# Patient Record
Sex: MALE | Race: BLACK OR AFRICAN AMERICAN | Employment: FULL TIME | URBAN - METROPOLITAN AREA
[De-identification: names, ages, dates, MRNs, and addresses within clinical notes are randomized per-mention and may not be internally consistent; named-entity substitution may affect disease eponyms.]

---

## 2020-11-26 ENCOUNTER — HOSPITAL ENCOUNTER (EMERGENCY)
Age: 40
Discharge: LEFT BEFORE DISCHARGE | End: 2020-11-26
Payer: COMMERCIAL

## 2020-11-26 ENCOUNTER — HOSPITAL ENCOUNTER (EMERGENCY)
Age: 40
Discharge: HOME | End: 2020-11-26
Attending: EMERGENCY MEDICINE
Payer: COMMERCIAL

## 2020-11-26 VITALS
SYSTOLIC BLOOD PRESSURE: 147 MMHG | RESPIRATION RATE: 16 BRPM | BODY MASS INDEX: 25.48 KG/M2 | HEART RATE: 102 BPM | DIASTOLIC BLOOD PRESSURE: 96 MMHG | HEIGHT: 70 IN | WEIGHT: 178 LBS | OXYGEN SATURATION: 95 % | TEMPERATURE: 98.7 F

## 2020-11-26 VITALS
DIASTOLIC BLOOD PRESSURE: 88 MMHG | SYSTOLIC BLOOD PRESSURE: 144 MMHG | RESPIRATION RATE: 18 BRPM | TEMPERATURE: 98.4 F | BODY MASS INDEX: 25.48 KG/M2 | WEIGHT: 178 LBS | HEIGHT: 70 IN | HEART RATE: 82 BPM | OXYGEN SATURATION: 95 %

## 2020-11-26 PROCEDURE — 6360000002 HC RX W HCPCS: Performed by: PHYSICIAN ASSISTANT

## 2020-11-26 PROCEDURE — 90471 IMMUNIZATION ADMIN: CPT | Performed by: PHYSICIAN ASSISTANT

## 2020-11-26 PROCEDURE — 6370000000 HC RX 637 (ALT 250 FOR IP): Performed by: PHYSICIAN ASSISTANT

## 2020-11-26 PROCEDURE — 99283 EMERGENCY DEPT VISIT LOW MDM: CPT

## 2020-11-26 PROCEDURE — 6370000000 HC RX 637 (ALT 250 FOR IP): Performed by: STUDENT IN AN ORGANIZED HEALTH CARE EDUCATION/TRAINING PROGRAM

## 2020-11-26 PROCEDURE — 12013 RPR F/E/E/N/L/M 2.6-5.0 CM: CPT

## 2020-11-26 PROCEDURE — 90715 TDAP VACCINE 7 YRS/> IM: CPT | Performed by: PHYSICIAN ASSISTANT

## 2020-11-26 RX ORDER — LIDOCAINE HYDROCHLORIDE 10 MG/ML
INJECTION, SOLUTION INFILTRATION; PERINEURAL
Status: DISCONTINUED
Start: 2020-11-26 | End: 2020-11-26 | Stop reason: HOSPADM

## 2020-11-26 RX ORDER — AMOXICILLIN AND CLAVULANATE POTASSIUM 875; 125 MG/1; MG/1
1 TABLET, FILM COATED ORAL ONCE
Status: COMPLETED | OUTPATIENT
Start: 2020-11-26 | End: 2020-11-26

## 2020-11-26 RX ORDER — OXYCODONE HYDROCHLORIDE 5 MG/1
5 TABLET ORAL ONCE
Status: COMPLETED | OUTPATIENT
Start: 2020-11-26 | End: 2020-11-26

## 2020-11-26 RX ORDER — CEPHALEXIN 500 MG/1
500 CAPSULE ORAL 2 TIMES DAILY
Qty: 14 CAPSULE | Refills: 0 | Status: SHIPPED | OUTPATIENT
Start: 2020-11-26 | End: 2020-12-03

## 2020-11-26 RX ORDER — LIDOCAINE HYDROCHLORIDE AND EPINEPHRINE 10; 10 MG/ML; UG/ML
20 INJECTION, SOLUTION INFILTRATION; PERINEURAL ONCE
Status: DISCONTINUED | OUTPATIENT
Start: 2020-11-26 | End: 2020-11-26 | Stop reason: HOSPADM

## 2020-11-26 RX ORDER — AMOXICILLIN AND CLAVULANATE POTASSIUM 875; 125 MG/1; MG/1
1 TABLET, FILM COATED ORAL 2 TIMES DAILY WITH MEALS
Qty: 20 TABLET | Refills: 0 | Status: SHIPPED | OUTPATIENT
Start: 2020-11-26 | End: 2020-12-06

## 2020-11-26 RX ORDER — LIDOCAINE HYDROCHLORIDE 10 MG/ML
20 INJECTION, SOLUTION INFILTRATION; PERINEURAL ONCE
Status: DISCONTINUED | OUTPATIENT
Start: 2020-11-26 | End: 2020-11-26 | Stop reason: HOSPADM

## 2020-11-26 RX ADMIN — TETANUS TOXOID, REDUCED DIPHTHERIA TOXOID AND ACELLULAR PERTUSSIS VACCINE, ADSORBED 0.5 ML: 5; 2.5; 8; 8; 2.5 SUSPENSION INTRAMUSCULAR at 05:07

## 2020-11-26 RX ADMIN — AMOXICILLIN AND CLAVULANATE POTASSIUM 1 TABLET: 875; 125 TABLET, FILM COATED ORAL at 05:07

## 2020-11-26 RX ADMIN — OXYCODONE 5 MG: 5 TABLET ORAL at 09:21

## 2020-11-26 ASSESSMENT — PAIN DESCRIPTION - DESCRIPTORS: DESCRIPTORS: DISCOMFORT

## 2020-11-26 ASSESSMENT — PAIN DESCRIPTION - PAIN TYPE
TYPE: ACUTE PAIN
TYPE: ACUTE PAIN

## 2020-11-26 ASSESSMENT — PAIN SCALES - GENERAL
PAINLEVEL_OUTOF10: 10

## 2020-11-26 ASSESSMENT — PAIN DESCRIPTION - LOCATION
LOCATION: MOUTH
LOCATION: MOUTH

## 2020-11-26 ASSESSMENT — PAIN DESCRIPTION - ORIENTATION: ORIENTATION: RIGHT

## 2020-11-26 NOTE — ED NOTES
Bed: 20  Expected date:   Expected time:   Means of arrival:   Comments:  triage     Abundio Sinha RN  11/26/20 2138
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
## 2020-11-28 NOTE — PROGRESS NOTES
CLINICAL PHARMACY NOTE: MEDS TO [address] [place] [address] Drive Select Patient?: No  Total # of Prescriptions Filled: 1   The following medications were delivered to the patient:  · Cephalexin 500 mg  Total # of Interventions Completed: 3  Time Spent (min): 15    Additional Documentation:

## 2020-11-30 ENCOUNTER — TELEPHONE (OUTPATIENT)
Dept: SURGERY | Age: 40
End: 2020-11-30

## 2020-11-30 ENCOUNTER — OFFICE VISIT (OUTPATIENT)
Dept: SURGERY | Age: 40
End: 2020-11-30
Payer: COMMERCIAL

## 2020-11-30 VITALS — HEIGHT: 70 IN | TEMPERATURE: 98.2 F | WEIGHT: 178 LBS | BODY MASS INDEX: 25.48 KG/M2

## 2020-11-30 PROCEDURE — 99203 OFFICE O/P NEW LOW 30 MIN: CPT | Performed by: PHYSICIAN ASSISTANT

## 2020-11-30 RX ORDER — ACETAMINOPHEN AND CODEINE PHOSPHATE 300; 30 MG/1; MG/1
1 TABLET ORAL EVERY 4 HOURS PRN
Qty: 10 TABLET | Refills: 0 | Status: SHIPPED | OUTPATIENT
Start: 2020-11-30 | End: 2020-12-05
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
## 2020-11-30 NOTE — TELEPHONE ENCOUNTER
Priti Nair pt was seen in ED 11/25. Please review and let me know when we need to see patient in office.

## 2020-12-01 ENCOUNTER — TELEPHONE (OUTPATIENT)
Dept: SURGERY | Age: 40
End: 2020-12-01

## 2021-03-16 ENCOUNTER — HOSPITAL ENCOUNTER (EMERGENCY)
Age: 41
Discharge: HOME | End: 2021-03-16
Attending: EMERGENCY MEDICINE
Payer: COMMERCIAL

## 2021-03-16 ENCOUNTER — APPOINTMENT (OUTPATIENT)
Dept: CT IMAGING | Age: 41
End: 2021-03-16
Payer: COMMERCIAL

## 2021-03-16 VITALS
SYSTOLIC BLOOD PRESSURE: 124 MMHG | RESPIRATION RATE: 16 BRPM | OXYGEN SATURATION: 97 % | TEMPERATURE: 97 F | WEIGHT: 178 LBS | BODY MASS INDEX: 25.48 KG/M2 | HEIGHT: 70 IN | DIASTOLIC BLOOD PRESSURE: 78 MMHG | HEART RATE: 57 BPM

## 2021-03-16 DIAGNOSIS — S39.012A STRAIN OF LUMBAR REGION, INITIAL ENCOUNTER: ICD-10-CM

## 2021-03-16 DIAGNOSIS — M54.17 LUMBOSACRAL RADICULOPATHY: Primary | ICD-10-CM

## 2021-03-16 LAB
ALBUMIN SERPL-MCNC: 4.4 G/DL (ref 3.5–5.2)
ALP BLD-CCNC: 54 U/L (ref 40–129)
ALT SERPL-CCNC: 10 U/L (ref 0–40)
ANION GAP SERPL CALCULATED.3IONS-SCNC: 6 MMOL/L (ref 7–16)
ANISOCYTOSIS: ABNORMAL
AST SERPL-CCNC: 21 U/L (ref 0–39)
BASOPHILS ABSOLUTE: 0.04 E9/L (ref 0–0.2)
BASOPHILS RELATIVE PERCENT: 0.4 % (ref 0–2)
BILIRUB SERPL-MCNC: 0.3 MG/DL (ref 0–1.2)
BUN BLDV-MCNC: 15 MG/DL (ref 6–20)
CALCIUM SERPL-MCNC: 9 MG/DL (ref 8.6–10.2)
CHLORIDE BLD-SCNC: 104 MMOL/L (ref 98–107)
CO2: 30 MMOL/L (ref 22–29)
CREAT SERPL-MCNC: 1.1 MG/DL (ref 0.7–1.2)
EOSINOPHILS ABSOLUTE: 0.19 E9/L (ref 0.05–0.5)
EOSINOPHILS RELATIVE PERCENT: 1.9 % (ref 0–6)
GFR AFRICAN AMERICAN: >60
GFR NON-AFRICAN AMERICAN: >60 ML/MIN/1.73
GLUCOSE BLD-MCNC: 91 MG/DL (ref 74–99)
HCT VFR BLD CALC: 41.6 % (ref 37–54)
HEMOGLOBIN: 13.6 G/DL (ref 12.5–16.5)
IMMATURE GRANULOCYTES #: 0.02 E9/L
IMMATURE GRANULOCYTES %: 0.2 % (ref 0–5)
LACTIC ACID: 1.1 MMOL/L (ref 0.5–2.2)
LIPASE: 18 U/L (ref 13–60)
LYMPHOCYTES ABSOLUTE: 1.85 E9/L (ref 1.5–4)
LYMPHOCYTES RELATIVE PERCENT: 19 % (ref 20–42)
MCH RBC QN AUTO: 32 PG (ref 26–35)
MCHC RBC AUTO-ENTMCNC: 32.7 % (ref 32–34.5)
MCV RBC AUTO: 97.9 FL (ref 80–99.9)
MONOCYTES ABSOLUTE: 0.62 E9/L (ref 0.1–0.95)
MONOCYTES RELATIVE PERCENT: 6.4 % (ref 2–12)
NEUTROPHILS ABSOLUTE: 7.04 E9/L (ref 1.8–7.3)
NEUTROPHILS RELATIVE PERCENT: 72.1 % (ref 43–80)
PDW BLD-RTO: 11.9 FL (ref 11.5–15)
PLATELET # BLD: 150 E9/L (ref 130–450)
PMV BLD AUTO: 12.2 FL (ref 7–12)
POTASSIUM REFLEX MAGNESIUM: 3.8 MMOL/L (ref 3.5–5)
RBC # BLD: 4.25 E12/L (ref 3.8–5.8)
SODIUM BLD-SCNC: 140 MMOL/L (ref 132–146)
TOTAL PROTEIN: 7 G/DL (ref 6.4–8.3)
WBC # BLD: 9.8 E9/L (ref 4.5–11.5)

## 2021-03-16 PROCEDURE — 85025 COMPLETE CBC W/AUTO DIFF WBC: CPT

## 2021-03-16 PROCEDURE — 96374 THER/PROPH/DIAG INJ IV PUSH: CPT

## 2021-03-16 PROCEDURE — 83605 ASSAY OF LACTIC ACID: CPT

## 2021-03-16 PROCEDURE — 83690 ASSAY OF LIPASE: CPT

## 2021-03-16 PROCEDURE — 6370000000 HC RX 637 (ALT 250 FOR IP): Performed by: EMERGENCY MEDICINE

## 2021-03-16 PROCEDURE — 96372 THER/PROPH/DIAG INJ SC/IM: CPT

## 2021-03-16 PROCEDURE — 74177 CT ABD & PELVIS W/CONTRAST: CPT

## 2021-03-16 PROCEDURE — 99284 EMERGENCY DEPT VISIT MOD MDM: CPT

## 2021-03-16 PROCEDURE — 99283 EMERGENCY DEPT VISIT LOW MDM: CPT

## 2021-03-16 PROCEDURE — 6360000004 HC RX CONTRAST MEDICATION: Performed by: RADIOLOGY

## 2021-03-16 PROCEDURE — 6360000002 HC RX W HCPCS: Performed by: STUDENT IN AN ORGANIZED HEALTH CARE EDUCATION/TRAINING PROGRAM

## 2021-03-16 PROCEDURE — 80053 COMPREHEN METABOLIC PANEL: CPT

## 2021-03-16 PROCEDURE — 2580000003 HC RX 258: Performed by: STUDENT IN AN ORGANIZED HEALTH CARE EDUCATION/TRAINING PROGRAM

## 2021-03-16 RX ORDER — HYDROCODONE BITARTRATE AND ACETAMINOPHEN 5; 325 MG/1; MG/1
1 TABLET ORAL ONCE
Status: COMPLETED | OUTPATIENT
Start: 2021-03-16 | End: 2021-03-16

## 2021-03-16 RX ORDER — HYDROCODONE BITARTRATE AND ACETAMINOPHEN 5; 325 MG/1; MG/1
1 TABLET ORAL EVERY 6 HOURS PRN
Qty: 8 TABLET | Refills: 0 | Status: SHIPPED | OUTPATIENT
Start: 2021-03-16 | End: 2021-03-19

## 2021-03-16 RX ORDER — KETOROLAC TROMETHAMINE 30 MG/ML
15 INJECTION, SOLUTION INTRAMUSCULAR; INTRAVENOUS ONCE
Status: COMPLETED | OUTPATIENT
Start: 2021-03-16 | End: 2021-03-16

## 2021-03-16 RX ORDER — ORPHENADRINE CITRATE 30 MG/ML
60 INJECTION INTRAMUSCULAR; INTRAVENOUS ONCE
Status: COMPLETED | OUTPATIENT
Start: 2021-03-16 | End: 2021-03-16

## 2021-03-16 RX ORDER — CYCLOBENZAPRINE HCL 10 MG
10 TABLET ORAL 3 TIMES DAILY PRN
Qty: 15 TABLET | Refills: 0 | Status: SHIPPED | OUTPATIENT
Start: 2021-03-16 | End: 2021-03-31

## 2021-03-16 RX ORDER — NAPROXEN 500 MG/1
500 TABLET ORAL 2 TIMES DAILY WITH MEALS
Qty: 60 TABLET | Refills: 0 | Status: SHIPPED | OUTPATIENT
Start: 2021-03-16 | End: 2021-12-29

## 2021-03-16 RX ORDER — 0.9 % SODIUM CHLORIDE 0.9 %
1000 INTRAVENOUS SOLUTION INTRAVENOUS ONCE
Status: COMPLETED | OUTPATIENT
Start: 2021-03-16 | End: 2021-03-16

## 2021-03-16 RX ADMIN — ORPHENADRINE CITRATE 60 MG: 60 INJECTION INTRAMUSCULAR; INTRAVENOUS at 19:37

## 2021-03-16 RX ADMIN — HYDROCODONE BITARTRATE AND ACETAMINOPHEN 1 TABLET: 5; 325 TABLET ORAL at 19:37

## 2021-03-16 RX ADMIN — SODIUM CHLORIDE 1000 ML: 9 INJECTION, SOLUTION INTRAVENOUS at 16:41

## 2021-03-16 RX ADMIN — KETOROLAC TROMETHAMINE 15 MG: 30 INJECTION, SOLUTION INTRAMUSCULAR at 16:49

## 2021-03-16 RX ADMIN — IOPAMIDOL 75 ML: 755 INJECTION, SOLUTION INTRAVENOUS at 17:46

## 2021-03-16 ASSESSMENT — ENCOUNTER SYMPTOMS
SORE THROAT: 0
COUGH: 0
NAUSEA: 0
ABDOMINAL PAIN: 0
BACK PAIN: 1
RHINORRHEA: 0
SHORTNESS OF BREATH: 0
VOMITING: 0
DIARRHEA: 0

## 2021-03-16 ASSESSMENT — PAIN DESCRIPTION - FREQUENCY: FREQUENCY: CONTINUOUS

## 2021-03-16 ASSESSMENT — PAIN DESCRIPTION - ORIENTATION: ORIENTATION: LEFT

## 2021-03-16 NOTE — ED PROVIDER NOTES
Marianna Trujillo is a 46 y.o. male with no PMHx who presents for evaluation of low back / flank pain, beginning prior to arrival.  The complaint has been persistent, moderate in severity, and worsened by movement. The patient states that one week ago he started to have low back pain on the bilateral lumbar spine. The patient states he was just \"powering through it\" at work. The patient notes that yesterday the pain started to be worse on the left side and he has had a hard time walking today. The pain is sharp in nature and then shoots up the spine. The patient denies any urine or fecal incontinence, saddle paresthesia. The patient denies any trauma or injury to the area. The history is provided by the patient and medical records. Review of Systems   Constitutional: Negative for chills, diaphoresis and fever. HENT: Negative for congestion, rhinorrhea and sore throat. Eyes: Negative for visual disturbance. Respiratory: Negative for cough and shortness of breath. Cardiovascular: Negative for chest pain. Gastrointestinal: Negative for abdominal pain, diarrhea, nausea and vomiting. Genitourinary: Negative for dysuria and urgency. Musculoskeletal: Positive for back pain. Skin: Negative for rash. Neurological: Negative for dizziness, light-headedness, numbness and headaches. Physical Exam  Vitals signs and nursing note reviewed. Constitutional:       General: He is in acute distress (appears uncomfortable). Appearance: Normal appearance. He is well-developed. He is ill-appearing. HENT:      Head: Normocephalic and atraumatic. Right Ear: External ear normal.      Left Ear: External ear normal.   Eyes:      General:         Right eye: No discharge. Left eye: No discharge. Extraocular Movements: Extraocular movements intact. Conjunctiva/sclera: Conjunctivae normal.   Neck:      Musculoskeletal: Normal range of motion and neck supple. Cardiovascular:      Rate and Rhythm: Normal rate and regular rhythm. Heart sounds: Normal heart sounds. No murmur. Pulmonary:      Effort: Pulmonary effort is normal. No respiratory distress. Breath sounds: Normal breath sounds. No stridor. No wheezing. Abdominal:      General: There is no distension. Palpations: Abdomen is soft. There is no mass. Tenderness: There is no abdominal tenderness. There is left CVA tenderness. Hernia: No hernia is present. Genitourinary:     Testes: Normal.         Right: Mass, tenderness or swelling not present. Right testis is descended. Left: Mass, tenderness or swelling not present. Left testis is descended. Musculoskeletal:         General: Tenderness (lumbar spine) present. Skin:     General: Skin is warm and dry. Coloration: Skin is not jaundiced or pale. Neurological:      Mental Status: He is alert and oriented to person, place, and time. Cranial Nerves: No cranial nerve deficit. Coordination: Coordination normal.          Yolette Rausch presents to the ED for evaluation of low back pain. Patient notes bilateral initially now worse one the left. Radiates down left and the back. Patient denies any numbness, tingling, urinary retention, stool or fecal incontinence. Denies any saddle paresthesia. Does not appear to be cauda equina. Workup in the ED revealed labs within normal limits, CT abdomen pelvis negative for acute findings. Patient was given Toradol, Norflex with some improvement of symptoms. Likely musculoskeletal nature, discussed with the patient. Reasons to return discussed. . Patient continues to be non-toxic on re-evaluation. Findings were discussed with the patient and reasons to immediately return to the ED were articulated to them.  They will follow-up with their PCP.    --------------------------------------------- PAST HISTORY ---------------------------------------------  Past Medical History:  has no past medical history on file. Past Surgical History:  has no past surgical history on file. Social History:  reports that he has never smoked. He has never used smokeless tobacco. He reports previous alcohol use. He reports current drug use. Frequency: 3.00 times per week. Drug: Marijuana. Family History: family history includes No Known Problems in his father. The patients home medications have been reviewed. Allergies: Patient has no known allergies.     -------------------------------------------------- RESULTS -------------------------------------------------  Labs:  Results for orders placed or performed during the hospital encounter of 03/16/21   CBC Auto Differential   Result Value Ref Range    WBC 9.8 4.5 - 11.5 E9/L    RBC 4.25 3.80 - 5.80 E12/L    Hemoglobin 13.6 12.5 - 16.5 g/dL    Hematocrit 41.6 37.0 - 54.0 %    MCV 97.9 80.0 - 99.9 fL    MCH 32.0 26.0 - 35.0 pg    MCHC 32.7 32.0 - 34.5 %    RDW 11.9 11.5 - 15.0 fL    Platelets 588 412 - 829 E9/L    MPV 12.2 (H) 7.0 - 12.0 fL    Neutrophils % 72.1 43.0 - 80.0 %    Immature Granulocytes % 0.2 0.0 - 5.0 %    Lymphocytes % 19.0 (L) 20.0 - 42.0 %    Monocytes % 6.4 2.0 - 12.0 %    Eosinophils % 1.9 0.0 - 6.0 %    Basophils % 0.4 0.0 - 2.0 %    Neutrophils Absolute 7.04 1.80 - 7.30 E9/L    Immature Granulocytes # 0.02 E9/L    Lymphocytes Absolute 1.85 1.50 - 4.00 E9/L    Monocytes Absolute 0.62 0.10 - 0.95 E9/L    Eosinophils Absolute 0.19 0.05 - 0.50 E9/L    Basophils Absolute 0.04 0.00 - 0.20 E9/L    Anisocytosis 1+    Lipase   Result Value Ref Range    Lipase 18 13 - 60 U/L   Lactic Acid, Plasma   Result Value Ref Range    Lactic Acid 1.1 0.5 - 2.2 mmol/L   Comprehensive Metabolic Panel w/ Reflex to MG   Result Value Ref Range    Sodium 140 132 - 146 mmol/L    Potassium reflex Magnesium 3.8 3.5 - 5.0 mmol/L    Chloride 104 98 - 107 mmol/L    CO2 30 (H) 22 - 29 mmol/L    Anion Gap 6 (L) 7 - 16 mmol/L    Glucose 91 74 - 99 mg/dL    BUN 15 6 - 20 mg/dL    CREATININE 1.1 0.7 - 1.2 mg/dL    GFR Non-African American >60 >=60 mL/min/1.73    GFR African American >60     Calcium 9.0 8.6 - 10.2 mg/dL    Total Protein 7.0 6.4 - 8.3 g/dL    Albumin 4.4 3.5 - 5.2 g/dL    Total Bilirubin 0.3 0.0 - 1.2 mg/dL    Alkaline Phosphatase 54 40 - 129 U/L    ALT 10 0 - 40 U/L    AST 21 0 - 39 U/L       Radiology:  CT ABDOMEN PELVIS W IV CONTRAST Additional Contrast? None   Final Result   No acute abnormality is seen in the abdomen or the pelvis.             ------------------------- NURSING NOTES AND VITALS REVIEWED ---------------------------  Date / Time Roomed:  3/16/2021  3:37 PM  ED Bed Assignment:  22/22    The nursing notes within the ED encounter and vital signs as below have been reviewed. /78   Pulse 57   Temp 97 °F (36.1 °C)   Resp 16   Ht 5' 10\" (1.778 m)   Wt 178 lb (80.7 kg)   SpO2 97%   BMI 25.54 kg/m²   Oxygen Saturation Interpretation: Normal      ------------------------------------------ PROGRESS NOTES ------------------------------------------  11:41 PM EDT  I have spoken with the patient and discussed todays results, in addition to providing specific details for the plan of care and counseling regarding the diagnosis and prognosis. Their questions are answered at this time and they are agreeable with the plan. I discussed at length with them reasons for immediate return here for re evaluation. They will followup with their primary care physician by calling their office tomorrow. --------------------------------- ADDITIONAL PROVIDER NOTES ---------------------------------  At this time the patient is without objective evidence of an acute process requiring hospitalization or inpatient management. They have remained hemodynamically stable throughout their entire ED visit and are stable for discharge with outpatient follow-up.      The plan has been discussed in detail and they are aware of the specific conditions for emergent return, as well as the importance of follow-up. Discharge Medication List as of 3/16/2021  7:24 PM      START taking these medications    Details   cyclobenzaprine (FLEXERIL) 10 MG tablet Take 1 tablet by mouth 3 times daily as needed for Muscle spasms, Disp-15 tablet, R-0, DAWPrint      naproxen (NAPROSYN) 500 MG tablet Take 1 tablet by mouth 2 times daily (with meals), Disp-60 tablet, R-0Print      HYDROcodone-acetaminophen (NORCO) 5-325 MG per tablet Take 1 tablet by mouth every 6 hours as needed for Pain for up to 3 days. , Disp-8 tablet, R-0Print             Diagnosis:  1. Lumbosacral radiculopathy    2. Strain of lumbar region, initial encounter        Disposition:  Patient's disposition: Discharge to home  Patient's condition is stable.          Jaiden Corley DO  Resident  03/16/21 9761

## 2021-03-17 NOTE — ED NOTES
Pt given d/c instructions. Pt to f.u with pcp in 3 days. Pt given instructions on when to return to ED. Pt verbalized understanding of instructions. Pt left in stable and ambulatory condition. Pt IV d/c without any complications.      Tahira Tariq RN  03/16/21 2010

## 2021-03-24 ENCOUNTER — HOSPITAL ENCOUNTER (EMERGENCY)
Age: 41
Discharge: HOME | End: 2021-03-25
Payer: COMMERCIAL

## 2021-03-24 DIAGNOSIS — M51.37 DEGENERATIVE DISC DISEASE AT L5-S1 LEVEL: ICD-10-CM

## 2021-03-24 DIAGNOSIS — G89.29 ACUTE EXACERBATION OF CHRONIC LOW BACK PAIN: Primary | ICD-10-CM

## 2021-03-24 DIAGNOSIS — M54.50 ACUTE EXACERBATION OF CHRONIC LOW BACK PAIN: Primary | ICD-10-CM

## 2021-03-24 PROCEDURE — 99282 EMERGENCY DEPT VISIT SF MDM: CPT

## 2021-03-24 PROCEDURE — 6360000002 HC RX W HCPCS: Performed by: PHYSICIAN ASSISTANT

## 2021-03-24 PROCEDURE — 96372 THER/PROPH/DIAG INJ SC/IM: CPT

## 2021-03-24 RX ORDER — TRAMADOL HYDROCHLORIDE 50 MG/1
50 TABLET ORAL EVERY 6 HOURS PRN
Qty: 20 TABLET | Refills: 0 | Status: SHIPPED | OUTPATIENT
Start: 2021-03-24 | End: 2021-03-29

## 2021-03-24 RX ORDER — ORPHENADRINE CITRATE 30 MG/ML
60 INJECTION INTRAMUSCULAR; INTRAVENOUS ONCE
Status: COMPLETED | OUTPATIENT
Start: 2021-03-24 | End: 2021-03-24

## 2021-03-24 RX ORDER — KETOROLAC TROMETHAMINE 30 MG/ML
30 INJECTION, SOLUTION INTRAMUSCULAR; INTRAVENOUS ONCE
Status: COMPLETED | OUTPATIENT
Start: 2021-03-24 | End: 2021-03-24

## 2021-03-24 RX ORDER — ORPHENADRINE CITRATE 100 MG/1
100 TABLET, EXTENDED RELEASE ORAL 2 TIMES DAILY
Qty: 10 TABLET | Refills: 0 | Status: SHIPPED | OUTPATIENT
Start: 2021-03-24 | End: 2021-03-29

## 2021-03-24 RX ADMIN — ORPHENADRINE CITRATE 60 MG: 60 INJECTION INTRAMUSCULAR; INTRAVENOUS at 10:01

## 2021-03-24 RX ADMIN — KETOROLAC TROMETHAMINE 30 MG: 30 INJECTION, SOLUTION INTRAMUSCULAR at 10:01

## 2021-03-24 ASSESSMENT — PAIN DESCRIPTION - ORIENTATION: ORIENTATION: LOWER

## 2021-03-24 ASSESSMENT — PAIN DESCRIPTION - FREQUENCY: FREQUENCY: CONTINUOUS

## 2021-03-24 ASSESSMENT — PAIN SCALES - GENERAL: PAINLEVEL_OUTOF10: 9

## 2021-03-24 NOTE — ED PROVIDER NOTES
[address] [place] [address]  Department of Emergency Medicine   ED  Encounter Note  Admit Date/RoomTime: 3/24/2021  8:42 AM  ED Room: 15/15    NAME: Raquel Orourke  : 1980  MRN: [id number]     Chief Complaint:  Back Pain (told to come back if not better, seen here last wk. )    History of Present Illness       Raquel Orourke is a 46 y.o. old male with a prior history of recurrent intermittent self limited episodes of low back pain, presents to the emergency department by private vehicle, evaluation of  non-traumatic acute-on-chronic, sharp left sided lower lumbar spine and sacral spine pain with radiation, to the left buttocks, for 2 week(s) prior to arrival.  There has been no recent injury as it relates to today's visit. Since onset the symptoms have been persistent and is moderate-to-severe. He has associated signs & symptoms of nothing additional.  He was seen here on  for same complaint which was nontraumatic in nature. He underwent CT imaging of his lower spine found to have degenerative narrowing L5-S1. He was prescribed Naprosyn and hydrocodone and Flexeril for pain. He still has Naprosyn left. He is out of his Norco and Flexeril which she says did help his pain. He reported back to work yesterday and was unable to perform his duties. There have been no new developing symptoms since his last visit. He denies any bladder or bowel incontinence , new weakness, tingling or paresthesias, recent back injection, recent spinal surgery, recent spinal/chiropractic manipulation, history of IVDA, fever or abdominal pain. The pain is aggraveated by any movement. He is not currently enrolled in an pain management program or managed by PCP or back specialist.        ROS   Pertinent positives and negatives are stated within HPI, all other systems reviewed and are negative. Past Medical History:  has no past medical history on file.     Surgical History:  has no past surgical history on file. Social History:  reports that he has never smoked. He has never used smokeless tobacco. He reports previous alcohol use. He reports current drug use. Frequency: 3.00 times per week. Drug: Marijuana. Family History: family history includes No Known Problems in his father. Allergies: Patient has no known allergies. Physical Exam   Oxygen Saturation Interpretation: Normal.        ED Triage Vitals   BP Temp Temp src Pulse Resp SpO2 Height Weight   -- -- -- -- -- -- -- --         Constitutional:  Alert, development consistent with age. HEENT:  NC/NT. Airway patent. Neck:  Normal ROM. Supple. Respiratory:  Clear to auscultation and breath sounds equal.  CV:  Regular rate and rhythm, normal heart sounds, without pathological murmurs, ectopy, gallops, or rubs. GI:  Abdomen Soft, nontender, good bowel sounds. No firm or pulsatile mass. Back: lower lumbar spine and sacral spine left sided. Tenderness: Moderate. Swelling: no.              Range of Motion: diminished range of motion. CVA Tenderness: No.            Straight leg raising:  Bilateral negative. Skin:  no erythema, rash or swelling noted. Distal Function:              Motor deficit: none. Sensory deficit: none. Pulse deficit: none. Calf Tenderness:  No Bilateral.               Edema:  none Both lower extremity(s). Gait:  wide based. Integument:  Normal turgor. Warm, dry, without visible rash. Lymphatics: No lymphangitis or adenopathy noted. Neurological:  Oriented. Motor functions intact. Lab / Imaging Results   (All laboratory and radiology results have been personally reviewed by myself)  Labs:  No results found for this visit on 03/24/21. Imaging: All Radiology results interpreted by Radiologist unless otherwise noted.   No orders to display       ED Course / Medical Decision Making     Medications

## 2021-03-24 NOTE — ED NOTES
Bed: 15  Expected date:   Expected time:   Means of arrival:   Comments:  BHI/low acuity     Antonia Thorpe RN  03/24/21 9243

## 2021-12-29 ENCOUNTER — HOSPITAL ENCOUNTER (EMERGENCY)
Age: 41
Discharge: LEFT BEFORE DISCHARGE | End: 2021-12-29

## 2021-12-29 VITALS
OXYGEN SATURATION: 98 % | WEIGHT: 184 LBS | DIASTOLIC BLOOD PRESSURE: 75 MMHG | TEMPERATURE: 97.8 F | HEART RATE: 85 BPM | BODY MASS INDEX: 26.4 KG/M2 | SYSTOLIC BLOOD PRESSURE: 141 MMHG | RESPIRATION RATE: 16 BRPM

## 2021-12-29 ASSESSMENT — PAIN SCALES - GENERAL: PAINLEVEL_OUTOF10: 10

## 2021-12-29 ASSESSMENT — PAIN DESCRIPTION - LOCATION: LOCATION: LEG

## 2021-12-29 ASSESSMENT — PAIN DESCRIPTION - DESCRIPTORS: DESCRIPTORS: SHARP

## 2021-12-29 ASSESSMENT — PAIN DESCRIPTION - ORIENTATION: ORIENTATION: LEFT;RIGHT

## 2021-12-29 ASSESSMENT — PAIN DESCRIPTION - PAIN TYPE: TYPE: ACUTE PAIN

## 2022-04-25 ENCOUNTER — HOSPITAL ENCOUNTER (OUTPATIENT)
Age: 42
Setting detail: OBSERVATION
Discharge: LEFT BEFORE DISCHARGE | End: 2022-04-26
Attending: EMERGENCY MEDICINE

## 2022-04-25 ENCOUNTER — APPOINTMENT (OUTPATIENT)
Dept: GENERAL RADIOLOGY | Age: 42
End: 2022-04-25

## 2022-04-25 ENCOUNTER — APPOINTMENT (OUTPATIENT)
Dept: CT IMAGING | Age: 42
End: 2022-04-25

## 2022-04-25 VITALS
SYSTOLIC BLOOD PRESSURE: 119 MMHG | DIASTOLIC BLOOD PRESSURE: 76 MMHG | RESPIRATION RATE: 21 BRPM | HEART RATE: 70 BPM | OXYGEN SATURATION: 100 %

## 2022-04-25 DIAGNOSIS — S09.90XA CLOSED HEAD INJURY, INITIAL ENCOUNTER: ICD-10-CM
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
## 2022-04-25 PROBLEM — T14.90XA TRAUMA: Status: ACTIVE | Noted: 2022-04-25

## 2022-04-25 LAB
ABO/RH: NORMAL
ACETAMINOPHEN LEVEL: <5 MCG/ML (ref 10–30)
ALBUMIN SERPL-MCNC: 4.4 G/DL (ref 3.5–5.2)
ALP BLD-CCNC: 55 U/L (ref 40–129)
ALT SERPL-CCNC: 13 U/L (ref 0–40)
ANION GAP SERPL CALCULATED.3IONS-SCNC: 6 MMOL/L (ref 7–16)
ANTIBODY SCREEN: NORMAL
APTT: 32.5 SEC (ref 24.5–35.1)
AST SERPL-CCNC: 22 U/L (ref 0–39)
BILIRUB SERPL-MCNC: 0.3 MG/DL (ref 0–1.2)
BUN BLDV-MCNC: 15 MG/DL (ref 6–20)
CALCIUM SERPL-MCNC: 9.1 MG/DL (ref 8.6–10.2)
CHLORIDE BLD-SCNC: 105 MMOL/L (ref 98–107)
CO2: 30 MMOL/L (ref 22–29)
CREAT SERPL-MCNC: 1.3 MG/DL (ref 0.7–1.2)
ETHANOL: <10 MG/DL (ref 0–0.08)
GFR AFRICAN AMERICAN: >60
GFR NON-AFRICAN AMERICAN: >60 ML/MIN/1.73
GLUCOSE BLD-MCNC: 92 MG/DL (ref 74–99)
HCT VFR BLD CALC: 39.6 % (ref 37–54)
HEMOGLOBIN: 13.1 G/DL (ref 12.5–16.5)
INR BLD: 1.2
LACTIC ACID: 0.8 MMOL/L (ref 0.5–2.2)
MCH RBC QN AUTO: 31.8 PG (ref 26–35)
MCHC RBC AUTO-ENTMCNC: 33.1 % (ref 32–34.5)
MCV RBC AUTO: 96.1 FL (ref 80–99.9)
PDW BLD-RTO: 11.7 FL (ref 11.5–15)
PLATELET # BLD: 160 E9/L (ref 130–450)
PMV BLD AUTO: 11.6 FL (ref 7–12)
POTASSIUM SERPL-SCNC: 3.8 MMOL/L (ref 3.5–5)
PROTHROMBIN TIME: 12.5 SEC (ref 9.3–12.4)
RBC # BLD: 4.12 E12/L (ref 3.8–5.8)
SALICYLATE, SERUM: <0.3 MG/DL (ref 0–30)
SODIUM BLD-SCNC: 141 MMOL/L (ref 132–146)
TOTAL PROTEIN: 6.7 G/DL (ref 6.4–8.3)
TRICYCLIC ANTIDEPRESSANTS SCREEN SERUM: NEGATIVE NG/ML
WBC # BLD: 8.2 E9/L (ref 4.5–11.5)

## 2022-04-25 PROCEDURE — 71045 X-RAY EXAM CHEST 1 VIEW: CPT

## 2022-04-25 PROCEDURE — 82077 ASSAY SPEC XCP UR&BREATH IA: CPT

## 2022-04-25 PROCEDURE — 71260 CT THORAX DX C+: CPT

## 2022-04-25 PROCEDURE — 80053 COMPREHEN METABOLIC PANEL: CPT

## 2022-04-25 PROCEDURE — 80307 DRUG TEST PRSMV CHEM ANLYZR: CPT

## 2022-04-25 PROCEDURE — 80179 DRUG ASSAY SALICYLATE: CPT

## 2022-04-25 PROCEDURE — 70450 CT HEAD/BRAIN W/O DYE: CPT

## 2022-04-25 PROCEDURE — 83605 ASSAY OF LACTIC ACID: CPT

## 2022-04-25 PROCEDURE — 85730 THROMBOPLASTIN TIME PARTIAL: CPT

## 2022-04-25 PROCEDURE — 99285 EMERGENCY DEPT VISIT HI MDM: CPT

## 2022-04-25 PROCEDURE — 74177 CT ABD & PELVIS W/CONTRAST: CPT

## 2022-04-25 PROCEDURE — 85610 PROTHROMBIN TIME: CPT

## 2022-04-25 PROCEDURE — 86901 BLOOD TYPING SEROLOGIC RH(D): CPT

## 2022-04-25 PROCEDURE — 36415 COLL VENOUS BLD VENIPUNCTURE: CPT

## 2022-04-25 PROCEDURE — 6810039000 HC L1 TRAUMA ALERT

## 2022-04-25 PROCEDURE — 85027 COMPLETE CBC AUTOMATED: CPT

## 2022-04-25 PROCEDURE — 80143 DRUG ASSAY ACETAMINOPHEN: CPT

## 2022-04-25 PROCEDURE — 72170 X-RAY EXAM OF PELVIS: CPT

## 2022-04-25 PROCEDURE — 6360000004 HC RX CONTRAST MEDICATION: Performed by: RADIOLOGY

## 2022-04-25 PROCEDURE — 72125 CT NECK SPINE W/O DYE: CPT

## 2022-04-25 PROCEDURE — 86900 BLOOD TYPING SEROLOGIC ABO: CPT

## 2022-04-25 PROCEDURE — 86850 RBC ANTIBODY SCREEN: CPT

## 2022-04-25 RX ORDER — SODIUM CHLORIDE 0.9 % (FLUSH) 0.9 %
5-40 SYRINGE (ML) INJECTION EVERY 12 HOURS SCHEDULED
Status: DISCONTINUED | OUTPATIENT
Start: 2022-04-26 | End: 2022-04-26 | Stop reason: HOSPADM

## 2022-04-25 RX ORDER — ENOXAPARIN SODIUM 100 MG/ML
30 INJECTION SUBCUTANEOUS 2 TIMES DAILY
Status: DISCONTINUED | OUTPATIENT
Start: 2022-04-26 | End: 2022-04-26 | Stop reason: HOSPADM

## 2022-04-25 RX ORDER — SODIUM CHLORIDE 0.9 % (FLUSH) 0.9 %
5-40 SYRINGE (ML) INJECTION PRN
Status: DISCONTINUED | OUTPATIENT
Start: 2022-04-25 | End: 2022-04-26 | Stop reason: HOSPADM

## 2022-04-25 RX ORDER — POLYETHYLENE GLYCOL 3350 17 G/17G
17 POWDER, FOR SOLUTION ORAL DAILY
Status: DISCONTINUED | OUTPATIENT
Start: 2022-04-26 | End: 2022-04-26 | Stop reason: HOSPADM

## 2022-04-25 RX ORDER — FENTANYL CITRATE 50 UG/ML
50 INJECTION, SOLUTION INTRAMUSCULAR; INTRAVENOUS ONCE
Status: DISCONTINUED | OUTPATIENT
Start: 2022-04-25 | End: 2022-04-26 | Stop reason: HOSPADM

## 2022-04-25 RX ORDER — SODIUM CHLORIDE 9 MG/ML
25 INJECTION, SOLUTION INTRAVENOUS PRN
Status: DISCONTINUED | OUTPATIENT
Start: 2022-04-25 | End: 2022-04-26 | Stop reason: HOSPADM

## 2022-04-25 RX ORDER — ONDANSETRON 2 MG/ML
4 INJECTION INTRAMUSCULAR; INTRAVENOUS EVERY 6 HOURS PRN
Status: DISCONTINUED | OUTPATIENT
Start: 2022-04-25 | End: 2022-04-26 | Stop reason: HOSPADM

## 2022-04-25 RX ORDER — ACETAMINOPHEN 325 MG/1
650 TABLET ORAL EVERY 4 HOURS PRN
Status: DISCONTINUED | OUTPATIENT
Start: 2022-04-25 | End: 2022-04-26 | Stop reason: HOSPADM

## 2022-04-25 RX ORDER — ONDANSETRON 4 MG/1
4 TABLET, ORALLY DISINTEGRATING ORAL EVERY 8 HOURS PRN
Status: DISCONTINUED | OUTPATIENT
Start: 2022-04-25 | End: 2022-04-26 | Stop reason: HOSPADM

## 2022-04-25 RX ADMIN — IOPAMIDOL 90 ML: 755 INJECTION, SOLUTION INTRAVENOUS at 22:06

## 2022-04-26 LAB
AMPHETAMINE SCREEN, URINE: NOT DETECTED
BARBITURATE SCREEN URINE: NOT DETECTED
BENZODIAZEPINE SCREEN, URINE: NOT DETECTED
CANNABINOID SCREEN URINE: POSITIVE
COCAINE METABOLITE SCREEN URINE: NOT DETECTED
FENTANYL SCREEN, URINE: NOT DETECTED
Lab: ABNORMAL
METHADONE SCREEN, URINE: NOT DETECTED
OPIATE SCREEN URINE: NOT DETECTED
OXYCODONE URINE: NOT DETECTED
PHENCYCLIDINE SCREEN URINE: NOT DETECTED

## 2022-04-26 PROCEDURE — 6370000000 HC RX 637 (ALT 250 FOR IP): Performed by: STUDENT IN AN ORGANIZED HEALTH CARE EDUCATION/TRAINING PROGRAM

## 2022-04-26 PROCEDURE — 80307 DRUG TEST PRSMV CHEM ANLYZR: CPT

## 2022-04-26 PROCEDURE — G0378 HOSPITAL OBSERVATION PER HR: HCPCS

## 2022-04-26 RX ADMIN — ACETAMINOPHEN 650 MG: 325 TABLET ORAL at 00:22

## 2022-04-26 ASSESSMENT — PAIN SCALES - GENERAL: PAINLEVEL_OUTOF10: 8
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
## 2023-02-24 ENCOUNTER — APPOINTMENT (OUTPATIENT)
Dept: CT IMAGING | Age: 43
End: 2023-02-24
Payer: COMMERCIAL

## 2023-02-24 ENCOUNTER — HOSPITAL ENCOUNTER (EMERGENCY)
Age: 43
Discharge: HOME | End: 2023-02-24
Payer: COMMERCIAL

## 2023-02-24 VITALS
HEART RATE: 57 BPM | SYSTOLIC BLOOD PRESSURE: 109 MMHG | TEMPERATURE: 97.8 F | OXYGEN SATURATION: 100 % | DIASTOLIC BLOOD PRESSURE: 60 MMHG | HEIGHT: 70 IN | RESPIRATION RATE: 14 BRPM | WEIGHT: 180 LBS | BODY MASS INDEX: 25.77 KG/M2

## 2023-02-24 DIAGNOSIS — G89.29 CHRONIC BILATERAL LOW BACK PAIN WITHOUT SCIATICA: Primary | ICD-10-CM

## 2023-02-24 DIAGNOSIS — M48.061 FORAMINAL STENOSIS OF LUMBAR REGION: ICD-10-CM

## 2023-02-24 DIAGNOSIS — M54.50 CHRONIC BILATERAL LOW BACK PAIN WITHOUT SCIATICA: Primary | ICD-10-CM

## 2023-02-24 PROCEDURE — 99284 EMERGENCY DEPT VISIT MOD MDM: CPT

## 2023-02-24 PROCEDURE — 72131 CT LUMBAR SPINE W/O DYE: CPT

## 2023-02-24 PROCEDURE — 96372 THER/PROPH/DIAG INJ SC/IM: CPT

## 2023-02-24 PROCEDURE — 6360000002 HC RX W HCPCS: Performed by: PHYSICIAN ASSISTANT

## 2023-02-24 RX ORDER — DEXAMETHASONE SODIUM PHOSPHATE 10 MG/ML
10 INJECTION INTRAMUSCULAR; INTRAVENOUS ONCE
Status: COMPLETED | OUTPATIENT
Start: 2023-02-24 | End: 2023-02-24

## 2023-02-24 RX ORDER — KETOROLAC TROMETHAMINE 30 MG/ML
30 INJECTION, SOLUTION INTRAMUSCULAR; INTRAVENOUS ONCE
Status: COMPLETED | OUTPATIENT
Start: 2023-02-24 | End: 2023-02-24

## 2023-02-24 RX ORDER — DEXAMETHASONE 6 MG/1
6 TABLET ORAL 2 TIMES DAILY WITH MEALS
Qty: 14 TABLET | Refills: 0 | Status: SHIPPED | OUTPATIENT
Start: 2023-02-24 | End: 2023-03-03

## 2023-02-24 RX ORDER — IBUPROFEN 800 MG/1
800 TABLET ORAL 2 TIMES DAILY PRN
Qty: 30 TABLET | Refills: 0 | Status: SHIPPED | OUTPATIENT
Start: 2023-02-24 | End: 2023-03-11

## 2023-02-24 RX ORDER — METHOCARBAMOL 500 MG/1
500 TABLET, FILM COATED ORAL 2 TIMES DAILY
Qty: 20 TABLET | Refills: 0 | Status: SHIPPED | OUTPATIENT
Start: 2023-02-24 | End: 2023-03-01 | Stop reason: SINTOL

## 2023-02-24 RX ORDER — ORPHENADRINE CITRATE 30 MG/ML
60 INJECTION INTRAMUSCULAR; INTRAVENOUS ONCE
Status: COMPLETED | OUTPATIENT
Start: 2023-02-24 | End: 2023-02-24

## 2023-02-24 RX ORDER — LIDOCAINE 4 G/G
1 PATCH TOPICAL DAILY
Qty: 30 PATCH | Refills: 0 | Status: SHIPPED | OUTPATIENT
Start: 2023-02-24 | End: 2023-03-26

## 2023-02-24 RX ADMIN — KETOROLAC TROMETHAMINE 30 MG: 30 INJECTION, SOLUTION INTRAMUSCULAR; INTRAVENOUS at 10:08

## 2023-02-24 RX ADMIN — ORPHENADRINE CITRATE 60 MG: 30 INJECTION INTRAMUSCULAR; INTRAVENOUS at 10:10

## 2023-02-24 RX ADMIN — DEXAMETHASONE SODIUM PHOSPHATE 10 MG: 10 INJECTION INTRAMUSCULAR; INTRAVENOUS at 10:07

## 2023-02-24 ASSESSMENT — PAIN SCALES - GENERAL: PAINLEVEL_OUTOF10: 10

## 2023-02-24 ASSESSMENT — PAIN DESCRIPTION - LOCATION: LOCATION: BACK

## 2023-02-24 ASSESSMENT — PAIN DESCRIPTION - DESCRIPTORS: DESCRIPTORS: SQUEEZING

## 2023-02-24 ASSESSMENT — PAIN DESCRIPTION - ORIENTATION: ORIENTATION: MID;LOWER

## 2023-02-24 NOTE — ED PROVIDER NOTES
Independent TIM Visit. [facility]  Department of Emergency Medicine   ED  Encounter Note  Admit Date/RoomTime: 2023  9:46 AM  ED Room: WAITING RESULTS/WAITING *    NAME: Mark Augustin  : 1980  MRN: [id number]     Chief Complaint:  Back Pain (Ongoing issue)    History of Present Illness        Mark Augustin is a 41 y.o. old male with a prior history of chronic back pain and prior herniated disc(s) of lumbar spine, presents to the emergency department by private vehicle, for non-traumatic chronic and constant, aching and sharp midline lower lumbar spine pain without radiation, for a few day(s) prior to arrival.  There has been no recent injury as it relates to today's visit. Since onset the symptoms have been persistent and is moderate in severity. He has associated signs & symptoms of nothing additional.   He denies any bladder or bowel incontinence , new weakness, tingling or paresthesias, recent back injection, recent spinal surgery, recent spinal/chiropractic manipulation, history of IVDA, fever, abdominal pain, bladder incontinence, bowel incontinence, bladder retention, bladder urgency, bowel urgency, saddle paresthesias , or sacral numbness. The pain is aggraveated by any movement and relieved by nothing in particular. He has never been enrolled in a pain management program.  Patient states he had an MRI last year and was supposed to see a neurosurgeon but his back got better so he went back to work. 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 Patient denies any loss of bowel or bladder function, general numbness or tingling. Patient states that it is spasming and causing a lot of discomfort. .  ROS   Pertinent positives and negatives are stated within HPI, all other systems reviewed and are negative. Past Medical History:  has no past medical history on file.     Surgical History:  has no past surgical history on file. Social History:  reports that he has never smoked. He has never used smokeless tobacco. He reports that he does not currently use alcohol. He reports current drug use. Frequency: 3.00 times per week. Drug: Marijuana [place]). Family History: family history includes No Known Problems in his father. Allergies: Patient has no known allergies. Physical Exam   Oxygen Saturation Interpretation: Normal.        ED Triage Vitals [02/24/23 0945]   BP Temp Temp Source Heart Rate Resp SpO2 Height Weight   109/60 97.8 °F (36.6 °C) Temporal 57 14 100 % 5' 10\" (1.778 m) 180 lb (81.6 kg)         Constitutional:  Alert, development consistent with age. HEENT:  NC/NT. Airway patent. Neck:  Normal ROM. Supple. Respiratory:  Clear to auscultation and breath sounds equal.  CV:  Regular rate and rhythm, normal heart sounds, without pathological murmurs, ectopy, gallops, or rubs. GI:  Abdomen Soft, nontender, good bowel sounds. No firm or pulsatile mass. Back: lower lumbar spine bilateral.             Tenderness: Mild. Swelling: no.              Range of Motion: full range with pain. CVA Tenderness: No CVA tenderness. Straight leg raising:  Bilateral negative. Skin:  tenderness noted over the lower lumbar spine L3, L4, L5 paraspinally. No step-off deformities midline. No evidence of abscess. No evidence of warmth, cellulitis or induration. Distal Function:              Motor deficit: none. Sensory deficit: none. Pulse deficit: none. Calf Tenderness:  No Bilateral.               Edema:  none Both lower extremity(s). Deep Tendon Reflexes (DTR's) to bilateral knee's and ankle's are normo-reflexive   Gait:  normal.  Integument:  Normal turgor. Warm, dry, without visible rash. Lymphatics: No lymphangitis or adenopathy noted. Neurological:  Oriented. Motor functions intact.     Ian [place] / Blease Stephanie Results   (All laboratory and radiology results have been personally reviewed by myself)  Labs:  No results found for this visit on 02/24/23. Imaging: All Radiology results interpreted by Radiologist unless otherwise noted. CT LUMBAR SPINE WO CONTRAST   Final Result   Degenerative changes at the lumbosacral junction with grade 1 retrolisthesis   of L5 over S1 and severe narrowing of bilateral neural foramina seen at this   level. ED Course / Medical Decision Making     Medications   ketorolac (TORADOL) injection 30 mg (30 mg IntraMUSCular Given 2/24/23 1008)   orphenadrine (NORFLEX) injection 60 mg (60 mg IntraMUSCular Given 2/24/23 1010)   dexamethasone (DECADRON) Oral 10 mg (10 mg Oral Given 2/24/23 1007)        Re-examination:  2/24/23       Time: 1130 patient was seen going to CAT scan and checked on. Patient states he is feeling some relief with medications given. Toradol IM 30 mg given, Decadron 10 mg orally and Norflex 60 mg IM given    Consult(s):   None    Procedure(s):   None    Medical Decision Making/Counseling:   Patient is a 57-year-old presenting with chronic back pain. Patient states he has had this issue for a while was found to have herniated disc. Patient had an MRI done at another facility which showed a herniated disc. Patient states he was with see his neurosurgeon last year but did not follow-up because the pain improved. Patient states he went back to work. 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 Patient states that he is able to stand but has a lot of pain with ambulating. Patient has no neurologic or sensory deficits. Patient has no loss of bowel or bladder function. Patient has no perianal or sacral numbness or tingling. No red flag signs. Patient had examination and pain noted paraspinally in lumbar region on L3, L4, L5. No evidence of abscess. Skin reviewed. No evidence of herpetic zoster. Patient had CT scan which revealed no acute fracture or dislocation which was reviewed by myself. Patient was medicated with Toradol 30 mg IM, Robaxin 60 mg IM and Decadron orally 10 mg. Patient is requesting no narcotics. Patient will be discharged home with Robaxin 750 to be taken twice a day for the next 15 days. Patient was educated at the risk, benefits side effects of this medication that it does cause drowsiness and he cannot drive. Patient will be given lidocaine patches to place on the paraspinal region never directly midline 12 hours on 12 hours off, ibuprofen 800 mg twice a day to be alternating with Tylenol 500 mg once a day. Patient also will be given some steroids Decadron 4 mg taper. Patient was recommended to follow back up with neurosurgery to discuss physical therapy, injections or surgery or what other options are necessary for his pain. Work excuse given. Patient is fully ambulatory no loss of bowel or bladder function and was highly educated the signs symptoms when to return back to the emergency department. Patient stable for outpatient follow-up. At this time the patient is without objective evidence of an acute process requiring inpatient management. History provided is without report of trauma, or neurological symptom. Exam shows evidence of no radicular symptoms without myelopathy or neurovascular compromise. Patient has no significant risk for spontaneous infectious process and is afebrile & non-toxic. Given symptomatic therapy in ER and prescriptions for home along with appropriate instructions regarding taking medications with food and using caution for drowsiness and sedation. No alcohol or driving while taking medication. Any red flag symptoms were to return immediately to the ER for evaluation but otherwise PCP follow up for reevaluation.       The emergency provider has spoken with the patient and discussed todays emergency visit, in addition to providing specific details for the plan of care and counseling regarding the diagnosis and prognosis. He was counseled on the role of the emergency department regarding prescribing medications for chronic conditions including Narcotic and other controlled substances. Based on the presenting complaint and nature of illness, the requested medications will not be provided today in prescription form and he is instructed to contact their provider for supplemental medications as soon as possible. Questions are answered at this time and they are agreeable with the plan. Assessment      1. Chronic bilateral low back pain without sciatica    2. Foraminal stenosis of lumbar region      Plan   Discharged home. Patient condition is stable    New Medications     New Prescriptions    DEXAMETHASONE (DECADRON) 6 MG TABLET    Take 1 tablet by mouth 2 times daily (with meals) for 7 days    IBUPROFEN (ADVIL;MOTRIN) 800 MG TABLET    Take 1 tablet by mouth 2 times daily as needed for Pain    LIDOCAINE 4 % EXTERNAL PATCH    Place 1 patch onto the skin daily    METHOCARBAMOL (ROBAXIN) 500 MG TABLET    Take 1 tablet by mouth in the morning and at bedtime for 5 days     Electronically signed by Candice Fuentes PA-C   DD: 2/24/23  **This report was transcribed using voice recognition software. Every effort was made to ensure accuracy; however, inadvertent computerized transcription errors may be present.   END OF ED PROVIDER NOTE      Candice Fuentes PA-C  02/24/23 7909

## 2023-02-24 NOTE — Clinical Note
Noble Cruz was seen and treated in our emergency department on 2/24/2023. He may return to work on 02/28/2023. If you have any questions or concerns, please don't hesitate to call.       René Sauceda PA-C

## 2023-02-24 NOTE — DISCHARGE INSTRUCTIONS
Ibuprofen twice daily with food. Tylenol for mild-to-moderate pain and robaxin for spasm, however use caution as may cause drowsiness or sedation. Do not drive or drink alcohol while taking. Any increased pain, worsening symptoms or concerns including numbness, tingling, sensation loss to the legs, genitals, rectum, or loss of bowel or bladder control should return to the ER for evaluation. Otherwise follow-up with your PCP in 5-7 days for recheck.

## 2023-03-01 ENCOUNTER — HOSPITAL ENCOUNTER (EMERGENCY)
Age: 43
Discharge: HOME | End: 2023-03-01
Attending: STUDENT IN AN ORGANIZED HEALTH CARE EDUCATION/TRAINING PROGRAM
Payer: COMMERCIAL

## 2023-03-01 VITALS
HEART RATE: 54 BPM | RESPIRATION RATE: 15 BRPM | DIASTOLIC BLOOD PRESSURE: 96 MMHG | WEIGHT: 180 LBS | HEIGHT: 74 IN | BODY MASS INDEX: 23.1 KG/M2 | OXYGEN SATURATION: 99 % | SYSTOLIC BLOOD PRESSURE: 136 MMHG | TEMPERATURE: 98.7 F

## 2023-03-01 DIAGNOSIS — S39.012A STRAIN OF LUMBAR REGION, INITIAL ENCOUNTER: Primary | ICD-10-CM

## 2023-03-01 PROCEDURE — 6370000000 HC RX 637 (ALT 250 FOR IP): Performed by: STUDENT IN AN ORGANIZED HEALTH CARE EDUCATION/TRAINING PROGRAM

## 2023-03-01 PROCEDURE — 6360000002 HC RX W HCPCS: Performed by: STUDENT IN AN ORGANIZED HEALTH CARE EDUCATION/TRAINING PROGRAM

## 2023-03-01 PROCEDURE — 99284 EMERGENCY DEPT VISIT MOD MDM: CPT

## 2023-03-01 PROCEDURE — 96372 THER/PROPH/DIAG INJ SC/IM: CPT

## 2023-03-01 RX ORDER — CYCLOBENZAPRINE HCL 5 MG
5 TABLET ORAL 2 TIMES DAILY PRN
Qty: 10 TABLET | Refills: 0 | Status: SHIPPED | OUTPATIENT
Start: 2023-03-01 | End: 2023-03-11

## 2023-03-01 RX ORDER — HYDROXYZINE HYDROCHLORIDE 10 MG/1
50 TABLET, FILM COATED ORAL ONCE
Status: COMPLETED | OUTPATIENT
Start: 2023-03-01 | End: 2023-03-01

## 2023-03-01 RX ORDER — ORPHENADRINE CITRATE 30 MG/ML
60 INJECTION INTRAMUSCULAR; INTRAVENOUS ONCE
Status: COMPLETED | OUTPATIENT
Start: 2023-03-01 | End: 2023-03-01

## 2023-03-01 RX ORDER — KETOROLAC TROMETHAMINE 30 MG/ML
15 INJECTION, SOLUTION INTRAMUSCULAR; INTRAVENOUS ONCE
Status: COMPLETED | OUTPATIENT
Start: 2023-03-01 | End: 2023-03-01

## 2023-03-01 RX ADMIN — ALUMINUM HYDROXIDE, MAGNESIUM HYDROXIDE, AND SIMETHICONE: 200; 200; 20 SUSPENSION ORAL at 08:16

## 2023-03-01 RX ADMIN — KETOROLAC TROMETHAMINE 15 MG: 30 INJECTION, SOLUTION INTRAMUSCULAR; INTRAVENOUS at 08:18

## 2023-03-01 RX ADMIN — ORPHENADRINE CITRATE 60 MG: 30 INJECTION INTRAMUSCULAR; INTRAVENOUS at 08:18

## 2023-03-01 RX ADMIN — HYDROXYZINE HYDROCHLORIDE 50 MG: 10 TABLET ORAL at 08:16

## 2023-03-01 ASSESSMENT — PAIN DESCRIPTION - LOCATION
LOCATION: BACK
LOCATION: BACK

## 2023-03-01 ASSESSMENT — LIFESTYLE VARIABLES
HOW OFTEN DO YOU HAVE A DRINK CONTAINING ALCOHOL: NEVER
HOW MANY STANDARD DRINKS CONTAINING ALCOHOL DO YOU HAVE ON A TYPICAL DAY: PATIENT DOES NOT DRINK

## 2023-03-01 ASSESSMENT — PAIN SCALES - GENERAL
PAINLEVEL_OUTOF10: 3
PAINLEVEL_OUTOF10: 7

## 2023-03-01 ASSESSMENT — PAIN DESCRIPTION - DESCRIPTORS: DESCRIPTORS: ACHING

## 2023-03-01 NOTE — Clinical Note
Ramon House was seen and treated in our emergency department on 3/1/2023. He may return to work on 03/06/2023. If you have any questions or concerns, please don't hesitate to call.       Regino Crow, DO

## 2023-03-01 NOTE — ED PROVIDER NOTES
[address]        Pt Name: Pedro Carolina  MRN: [id number]  [place] 1980  Date of evaluation: 3/1/2023  Provider: Ha George DO  PCP: No primary care provider on file. Note Started: 7:23 AM EST 3/1/23    CHIEF COMPLAINT       Chief Complaint   Patient presents with    Back Pain     Previously seen for this Friday 2/24     Medication Reaction     Reaction to meds we gave on 2/24- itchiness, gerd        HISTORY OF PRESENT ILLNESS: 1 or more Elements   History From: patient    Limitations to history : None    Pedro Carolina is a 41 y.o. male who presents to the emergency department complaining of back pain and concern for possible medication reaction. Patient states that his symptoms were sudden onset several days ago, persistent, moderate severity, nothing makes it better or worse. 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 He had a CT scan performed at that time and was treated with muscle relaxer, steroids, and pain medications and lidocaine patches and discharged home. He states that he feels itchy and feels like he is getting reflux from the medications that were given because he is only able to take everything at night because he is scared that is going to make him sleepy for work. Patient describes back pain as aching in his lower back and nonradiating. Nothing makes it better or worse. He denies any fall, trauma, fever, urinary incontinence, bowel incontinence, saddle anesthesia, numbness or tingling in his legs, recent hospitalization, recent was, or other acute symptoms or concerns. He does not have any history of back surgeries. Nursing Notes were all reviewed and agreed with or any disagreements were addressed in the HPI.     REVIEW OF SYSTEMS :      Review of Systems    Positives and Pertinent negatives as per HPI. SURGICAL HISTORY   History reviewed. No pertinent surgical history. [address]       Discharge Medication List as of 3/1/2023  8:27 AM        CONTINUE these medications which have NOT CHANGED    Details   dexamethasone (DECADRON) 6 MG tablet Take 1 tablet by mouth 2 times daily (with meals) for 7 days, Disp-14 tablet, R-0Normal      lidocaine 4 % external patch Place 1 patch onto the skin daily, TransDERmal, DAILY Starting Fri 2/24/2023, Until Sun 3/26/2023, For 30 days, Disp-30 patch, R-0, Normal      ibuprofen (ADVIL;MOTRIN) 800 MG tablet Take 1 tablet by mouth 2 times daily as needed for Pain, Disp-30 tablet, R-0Normal             ALLERGIES     Patient has no known allergies. FAMILYHISTORY       Family History   Problem Relation Age of Onset    No Known Problems Father         SOCIAL HISTORY       Social History     Tobacco Use    Smoking status: Never    Smokeless tobacco: Never   Vaping Use    Vaping Use: Never used   Substance Use Topics    Alcohol use: Not Currently    Drug use: Yes     Frequency: 3.0 times per week     Types: Marijuana (Weed)       SCREENINGS                         CIWA Assessment  BP: (!) 136/96  Heart Rate: 54           PHYSICAL EXAM  1 or more Elements     ED Triage Vitals   BP Temp Temp src Pulse Resp SpO2 Height Weight   -- -- -- -- -- -- -- --       Physical Exam      Constitutional/General: Alert and oriented x3  Head: Normocephalic and atraumatic  Eyes: PERRL, EOMI, conjunctiva normal, sclera non icteric  ENT:  Oropharynx clear, handling secretions, no trismus, no asymmetry of the posterior oropharynx or uvular edema  Neck: Supple, full ROM, no stridor, no meningeal signs  Respiratory: Lungs clear to auscultation bilaterally, no wheezes, rales, or rhonchi. Not in respiratory distress  Cardiovascular:  Regular rate. Regular rhythm. No murmurs, no gallops, no rubs. 2+ distal pulses. Equal extremity pulses. Chest: No chest wall tenderness  GI:  Abdomen Soft, Non tender, Non distended. +BS. No rebound, guarding, or rigidity. No pulsatile masses. Musculoskeletal: Moves all extremities x 4. Warm and well perfused, no clubbing, no cyanosis, no edema. Mild tenderness diffusely over the lower back. No step-offs or deformities. No evidence of trauma capillary refill <3 seconds  Integument: skin warm and dry. No rashes. Neurologic: GCS 15, no focal deficits, symmetric strength 5/5 in the upper and lower extremities bilaterally  Psychiatric: Normal Affect      DIAGNOSTIC RESULTS   LABS:    Labs Reviewed - No data to display    As interpreted by me, the above displayed labs are abnormal. All other labs obtained during this visit were within normal range or not returned as of this dictation. RADIOLOGY:   Non-plain film images such as CT, Ultrasound and MRI are read by the radiologist.     Interpretation per the Radiologist below, if available at the time of this note:    No orders to display     [facility]    Result Date: 2/24/2023  EXAMINATION: CT OF [facility] 276 WITHOUT CONTRAST  2/24/2023 TECHNIQUE: CT of the lumbar spine was performed without the administration of intravenous contrast. Multiplanar reformatted images are provided for review. Adjustment of mA and/or kV according to patient size was utilized. Automated exposure control, iterative reconstruction, and/or weight based adjustment of the mA/kV was utilized to reduce the radiation dose to as low as reasonably achievable.  COMPARISON: None HISTORY: ORDERING SYSTEM PROVIDED HISTORY: hx herniated disc, MRI last year never followed up with neurosurgery mopping and reinjured area TECHNOLOGIST PROVIDED HISTORY: Reason for exam:->hx herniated disc, MRI last year never followed up with neurosurgery mopping and reinjured area Decision Support Exception - unselect if not a suspected or confirmed emergency medical condition->Emergency Medical Condition (MA) FINDINGS: There is straightening of the alignment of the columns of the lumbar spine. Grade 1 anterolisthesis of L5 over S1 is seen. The vertebral body heights are maintained. No osseous destructive lesion is seen. Decreased intervertebral disc height visualized at L5-S1 with nitrogen disc phenomena visualized. Severe narrowing of bilateral neural foramina visualized at L5-S1. No paraspinal mass is seen. Degenerative changes at the lumbosacral junction with grade 1 retrolisthesis of L5 over S1 and severe narrowing of bilateral neural foramina seen at this level. No results found. PROCEDURES   Unless otherwise noted below, none     Procedures    CRITICAL CARE TIME (.cct)   0    PAST MEDICAL HISTORY/Chronic Conditions Affecting Care      has no past medical history on file. EMERGENCY DEPARTMENT COURSE    Vitals:    Vitals:    03/01/23 0729 03/01/23 0828   BP: (!) 141/93 (!) 136/96   Pulse: 62 54   Resp: 14 15   Temp: 98.7 °F (37.1 °C)    TempSrc: Oral    SpO2: 98% 99%   Weight: 180 lb (81.6 kg)    Height: 6' 2\" (1.88 m)        Patient was given the following medications:  Medications   orphenadrine (NORFLEX) injection 60 mg (60 mg IntraMUSCular Given 3/1/23 0818)   ketorolac (TORADOL) injection 15 mg (15 mg IntraMUSCular Given 3/1/23 0818)   hydrOXYzine HCl (ATARAX) tablet 50 mg (50 mg Oral Given 3/1/23 0816)   aluminum & magnesium hydroxide-simethicone (MAALOX) 30 mL, lidocaine viscous hcl (XYLOCAINE) 5 mL (GI COCKTAIL) ( Oral Given 3/1/23 0816)           Medical Decision Making/Differential Diagnosis:    CC/HPI Summary, Social Determinants of health, Records Reviewed, DDx, testing done/not done, ED Course, Reassessment, disposition considerations/shared decision making with patient, consults, disposition:        The patient is a 57-year-old male presents emergency department complaining of back pain and concern for medication reaction.   He is hemodynamically stable, nontoxic, in no acute distress. No known social detriments to his health. No red flag signs or symptoms. He denies any saddle anesthesia, numbness or tingling in his legs, fevers, bladder or bowel incontinence or other acute symptoms or concerns. Prior records reviewed and patient was actually seen here on February 24 for his back pain and had a CT scan performed at that time that showed degenerative changes and narrowing of the bilateral neuro foramina at L5-S1. Patient was discharged home with ibuprofen, lidocaine patch, Robaxin, and Decadron. Differential diagnosis includes was not limited to acute back strain versus sciatica versus herniated disc. Patient was complaining of some GERD symptoms but did not have a chest pain or shortness of breath. He was given a GI cocktail. He was given Atarax for his itchiness. Patient was given Toradol IM and Norflex IM as well. He is already on steroids at home. I discussed with him stopping the Robaxin and starting Flexeril to see if that helped or made a difference at all. Patient was discharged home in stable condition with strict return precautions. CONSULTS: (Who and What was discussed)  None        I am the Primary Clinician of Record. FINAL IMPRESSION      1. Strain of lumbar region, initial encounter          DISPOSITION/PLAN     DISPOSITION Decision To Discharge 03/01/2023 08:27:47 AM      PATIENT REFERRED TO:  Follow up with your doctor or call [phone] for a family doctor. Follow up with your doctor or call [phone] for a family doctor.   Schedule an appointment as soon as possible for a visit       DISCHARGE MEDICATIONS:  Discharge Medication List as of 3/1/2023  8:27 AM        START taking these medications    Details   cyclobenzaprine (FLEXERIL) 5 MG tablet Take 1 tablet by mouth 2 times daily as needed for Muscle spasms, Disp-10 tablet, R-0Normal             DISCONTINUED MEDICATIONS:  Discharge Medication List as of 3/1/2023  8:27 AM (Please note that portions of this note were completed with a voice recognition program.  Efforts were made to edit the dictations but occasionally words are mis-transcribed.)    Thi Emery DO (electronically signed)           Thi Emery DO  03/01/23 2018

## 2023-03-01 NOTE — ED NOTES
Pt presents to the ED from home with wife, has back pain which was previously treated here on 2/24. Was given meds including muscle relaxer and pt states it is causing severe itching and gerd s/s.       Robert  03/01/23 0781

## 2023-03-01 NOTE — Clinical Note
Isaias Vergara was seen and treated in our emergency department on 3/1/2023. He may return to work on 03/06/2023. If you have any questions or concerns, please don't hesitate to call.       Madison Hsu, DO

## 2023-07-29 ENCOUNTER — HOSPITAL ENCOUNTER (EMERGENCY)
Age: 43
Discharge: HOME | End: 2023-07-29
Payer: COMMERCIAL

## 2023-07-29 ENCOUNTER — APPOINTMENT (OUTPATIENT)
Dept: CT IMAGING | Age: 43
End: 2023-07-29
Payer: COMMERCIAL

## 2023-07-29 VITALS
DIASTOLIC BLOOD PRESSURE: 81 MMHG | HEART RATE: 76 BPM | RESPIRATION RATE: 14 BRPM | OXYGEN SATURATION: 98 % | TEMPERATURE: 98.1 F | SYSTOLIC BLOOD PRESSURE: 132 MMHG

## 2023-07-29 DIAGNOSIS — S39.012A STRAIN OF LUMBAR REGION, INITIAL ENCOUNTER: Primary | ICD-10-CM

## 2023-07-29 PROCEDURE — 72131 CT LUMBAR SPINE W/O DYE: CPT

## 2023-07-29 PROCEDURE — 99284 EMERGENCY DEPT VISIT MOD MDM: CPT

## 2023-07-29 PROCEDURE — 6370000000 HC RX 637 (ALT 250 FOR IP)

## 2023-07-29 PROCEDURE — 96372 THER/PROPH/DIAG INJ SC/IM: CPT

## 2023-07-29 PROCEDURE — 6360000002 HC RX W HCPCS

## 2023-07-29 RX ORDER — CYCLOBENZAPRINE HCL 10 MG
10 TABLET ORAL 3 TIMES DAILY PRN
Qty: 21 TABLET | Refills: 0 | Status: SHIPPED | OUTPATIENT
Start: 2023-07-29 | End: 2023-08-08

## 2023-07-29 RX ORDER — LIDOCAINE 50 MG/G
1 PATCH TOPICAL DAILY
Qty: 10 PATCH | Refills: 0 | Status: SHIPPED | OUTPATIENT
Start: 2023-07-29 | End: 2023-08-08

## 2023-07-29 RX ORDER — NAPROXEN 500 MG/1
500 TABLET ORAL 2 TIMES DAILY PRN
Qty: 60 TABLET | Refills: 0 | Status: SHIPPED | OUTPATIENT
Start: 2023-07-29

## 2023-07-29 RX ORDER — KETOROLAC TROMETHAMINE 30 MG/ML
30 INJECTION, SOLUTION INTRAMUSCULAR; INTRAVENOUS ONCE
Status: COMPLETED | OUTPATIENT
Start: 2023-07-29 | End: 2023-07-29

## 2023-07-29 RX ORDER — CYCLOBENZAPRINE HCL 10 MG
10 TABLET ORAL ONCE
Status: COMPLETED | OUTPATIENT
Start: 2023-07-29 | End: 2023-07-29

## 2023-07-29 RX ORDER — DEXAMETHASONE SODIUM PHOSPHATE 10 MG/ML
8 INJECTION INTRAMUSCULAR; INTRAVENOUS ONCE
Status: COMPLETED | OUTPATIENT
Start: 2023-07-29 | End: 2023-07-29

## 2023-07-29 RX ADMIN — CYCLOBENZAPRINE 10 MG: 10 TABLET, FILM COATED ORAL at 01:15

## 2023-07-29 RX ADMIN — KETOROLAC TROMETHAMINE 30 MG: 30 INJECTION, SOLUTION INTRAMUSCULAR; INTRAVENOUS at 01:14

## 2023-07-29 RX ADMIN — DEXAMETHASONE SODIUM PHOSPHATE 8 MG: 10 INJECTION INTRAMUSCULAR; INTRAVENOUS at 01:15

## 2023-07-29 ASSESSMENT — PAIN DESCRIPTION - DESCRIPTORS: DESCRIPTORS: THROBBING

## 2023-07-29 ASSESSMENT — PAIN SCALES - GENERAL: PAINLEVEL_OUTOF10: 10

## 2023-07-29 ASSESSMENT — PAIN DESCRIPTION - LOCATION: LOCATION: BACK

## 2023-07-29 ASSESSMENT — PAIN DESCRIPTION - ORIENTATION: ORIENTATION: LOWER

## 2023-07-29 NOTE — ED PROVIDER NOTES
Independent TIM Visit. [facility]   ED  Encounter Note  Admit Date/RoomTime: 2023 12:35 AM  ED Room: 35/35    NAME: Maggie Lowery  : 1980  MRN: [id number]     Chief Complaint:  Back Pain (Chronic back pain, states it is throbbing. Was here earlier today for the same thing, was never seen and left. )    History of Present Illness       Maggie Lowery is a 41 y.o. old male with a prior history of chronic back pain, presents to the emergency department by private vehicle, for traumatic acute-on-chronic, aching midline lower lumbar spine pain without radiation, for a few hour(s) prior to arrival.  There has been a new injury as it relates to today's presenting complaint. Since onset the symptoms have been persistent and is mild-to-moderate in severity. He has associated signs & symptoms of nothing additional.   He denies any bladder or bowel incontinence , new weakness, tingling or paresthesias, recent back injection, recent spinal surgery, recent spinal/chiropractic manipulation, history of IVDA, fever, abdominal pain, or saddle paresthesias . The pain is aggraveated by flexion, extension, and twisting and relieved by nothing in particular. He is not currently enrolled in an pain management program or managed by PCP or back specialist.   Patient states that they were carrying this past year, and he states that this washer slipped and he fell backwards landing on his lower back. States that he does have a history of chronic lower back pain, but did have an injury today. Denies any numbness, weakness or paresthesias. Patient states he came earlier but it was too busy so he left. He denies any numbness, weakness or paresthesias. No other complaints concerns at this time. .  ROS   Pertinent positives and negatives are stated within HPI, all other systems reviewed and are negative.     Past Medical History:  has no past medical limited to vertebral fracture, disc herniation, acute on chronic lower back pain, lumbar strain. Diagnostic studies including labs and imagining which was interpreted by radiologist reveals CT lumbar spine shows unremarkable noncontrast CT of the lumbar spine. . Consults included none. Results were discussed with the patient, he verbalized understanding. Patient was given p.o. Flexeril, IM Toradol, and IM Decadron for their symptoms with good improvement. Patient will be discharged home with the following prescriptions, Flexeril, naproxen, and Lidoderm patches. Discussed appropriate use and potential side effects of starting the prescribed medications. Patient continues to be non-toxic on re-evaluation. Findings were discussed with the patient and reasons to immediately return to the ED were articulated to them. They will follow-up with their PMD and patient verbalized understanding. All questions were addressed. They understand return precautions and discharge instructions. The patient expressed understanding. Vitals were stable and they were in no distress at discharge.        Discharge Instructions:   Patient referred to  [facility]  [phone]  Schedule an appointment as soon as possible for a visit in 3 days  For follow up      MEDICATIONS:   DISCHARGE MEDICATIONS:  Discharge Medication List as of 7/29/2023  2:14 AM        START taking these medications    Details   cyclobenzaprine (FLEXERIL) 10 MG tablet Take 1 tablet by mouth 3 times daily as needed for Muscle spasms, Disp-21 tablet, R-0Normal      naproxen (NAPROSYN) 500 MG tablet Take 1 tablet by mouth 2 times daily as needed for Pain, Disp-60 tablet, R-0Normal      lidocaine (LIDODERM) 5 % Place 1 patch onto the skin daily for 10 days 12 hours on, 12 hours off., Disp-10 patch, R-0Normal             DISCONTINUED MEDICATIONS:  Discharge Medication List as of 7/29/2023  2:14 AM        STOP taking these medications       ibuprofen

## 2025-01-28 ENCOUNTER — HOSPITAL ENCOUNTER (EMERGENCY)
Age: 45
Discharge: HOME | End: 2025-01-28
Attending: EMERGENCY MEDICINE
Payer: COMMERCIAL

## 2025-01-28 VITALS
HEART RATE: 54 BPM | WEIGHT: 179 LBS | DIASTOLIC BLOOD PRESSURE: 87 MMHG | OXYGEN SATURATION: 99 % | SYSTOLIC BLOOD PRESSURE: 128 MMHG | TEMPERATURE: 98.2 F | HEIGHT: 70 IN | RESPIRATION RATE: 14 BRPM | BODY MASS INDEX: 25.62 KG/M2

## 2025-01-28 DIAGNOSIS — Z20.2 EXPOSURE TO STD: Primary | ICD-10-CM

## 2025-01-28 DIAGNOSIS — Z20.2 TRICHOMONAS EXPOSURE: ICD-10-CM

## 2025-01-28 LAB
BACTERIA URNS QL MICRO: ABNORMAL
BILIRUB UR QL STRIP: NEGATIVE
CLARITY UR: CLEAR
COLOR UR: YELLOW
GLUCOSE UR STRIP-MCNC: NEGATIVE MG/DL
HGB UR QL STRIP.AUTO: NEGATIVE
KETONES UR STRIP-MCNC: NEGATIVE MG/DL
LEUKOCYTE ESTERASE UR QL STRIP: NEGATIVE
MUCOUS THREADS URNS QL MICRO: PRESENT
NITRITE UR QL STRIP: NEGATIVE
PH UR STRIP: 6 [PH] (ref 5–9)
PROT UR STRIP-MCNC: NEGATIVE MG/DL
RBC #/AREA URNS HPF: ABNORMAL /HPF
SP GR UR STRIP: 1.02 (ref 1–1.03)
UROBILINOGEN UR STRIP-ACNC: 0.2 EU/DL (ref 0–1)
WBC #/AREA URNS HPF: ABNORMAL /HPF

## 2025-01-28 PROCEDURE — 6360000002 HC RX W HCPCS: Performed by: EMERGENCY MEDICINE

## 2025-01-28 PROCEDURE — 87491 CHLMYD TRACH DNA AMP PROBE: CPT

## 2025-01-28 PROCEDURE — 96372 THER/PROPH/DIAG INJ SC/IM: CPT

## 2025-01-28 PROCEDURE — 6370000000 HC RX 637 (ALT 250 FOR IP): Performed by: EMERGENCY MEDICINE

## 2025-01-28 PROCEDURE — 99284 EMERGENCY DEPT VISIT MOD MDM: CPT

## 2025-01-28 PROCEDURE — 87086 URINE CULTURE/COLONY COUNT: CPT

## 2025-01-28 PROCEDURE — 87591 N.GONORRHOEAE DNA AMP PROB: CPT

## 2025-01-28 PROCEDURE — 81001 URINALYSIS AUTO W/SCOPE: CPT

## 2025-01-28 RX ORDER — WATER 10 ML/10ML
INJECTION INTRAMUSCULAR; INTRAVENOUS; SUBCUTANEOUS
Status: DISCONTINUED
Start: 2025-01-28 | End: 2025-01-28 | Stop reason: HOSPADM

## 2025-01-28 RX ORDER — METRONIDAZOLE 500 MG/1
2000 TABLET ORAL ONCE
Status: COMPLETED | OUTPATIENT
Start: 2025-01-28 | End: 2025-01-28

## 2025-01-28 RX ORDER — AZITHROMYCIN 250 MG/1
1000 TABLET, FILM COATED ORAL ONCE
Status: COMPLETED | OUTPATIENT
Start: 2025-01-28 | End: 2025-01-28

## 2025-01-28 RX ORDER — CEFTRIAXONE 500 MG/1
500 INJECTION, POWDER, FOR SOLUTION INTRAMUSCULAR; INTRAVENOUS ONCE
Status: COMPLETED | OUTPATIENT
Start: 2025-01-28 | End: 2025-01-28

## 2025-01-28 RX ADMIN — METRONIDAZOLE 2000 MG: 500 TABLET ORAL at 08:32

## 2025-01-28 RX ADMIN — AZITHROMYCIN 1000 MG: 250 TABLET, FILM COATED ORAL at 08:32

## 2025-01-28 RX ADMIN — CEFTRIAXONE SODIUM 500 MG: 500 INJECTION, POWDER, FOR SOLUTION INTRAMUSCULAR; INTRAVENOUS at 08:32

## 2025-01-28 ASSESSMENT — PAIN - FUNCTIONAL ASSESSMENT: PAIN_FUNCTIONAL_ASSESSMENT: NONE - DENIES PAIN

## 2025-01-28 NOTE — ED PROVIDER NOTES
[facility] EMERGENCY DEPARTMENT  EMERGENCY DEPARTMENT ENCOUNTER        Pt Name: Milad Franklin  MRN: [id number]  Birthdate 1980  Date of evaluation: 1/28/2025  Provider: Sherri Cheung MD  PCP: No primary care provider on file.  Note Started: 6:31 AM EST 1/28/25    CHIEF COMPLAINT       Chief Complaint   Patient presents with    Exposure to STD       HISTORY OF PRESENT ILLNESS: 1 or more Elements   History From: Patient    Limitations to history : None    Milad Franklin is a 44 y.o. male who presents is concerned about trichomonas exposure for STD.  Patient states he is having unprotected sex recently with a woman who states that she went to her doctor and was told she had trichomonas.  He is concerned about STD exposure.  Denies any symptoms.  Denies any abdominal pain no nausea vomiting no fever no testicular pain or swelling no penile discharge no rash no lesions.  No urinary complaints no dysuria urgency or frequency.    Nursing Notes were all reviewed and agreed with or any disagreements were addressed in the HPI.      REVIEW OF EXTERNAL NOTE :       PDMP reviewed    REVIEW OF SYSTEMS :           Positives and Pertinent negatives as per HPI.     SURGICAL HISTORY   No past surgical history on file.    CURRENTMEDICATIONS       Previous Medications    NAPROXEN (NAPROSYN) 500 MG TABLET    Take 1 tablet by mouth 2 times daily as needed for Pain       ALLERGIES     Patient has no known allergies.    FAMILYHISTORY       Family History   Problem Relation Age of Onset    No Known Problems Father         SOCIAL HISTORY       Social History     Tobacco Use    Smoking status: Never    Smokeless tobacco: Never   Vaping Use    Vaping status: Never Used   Substance Use Topics    Alcohol use: Not Currently    Drug use: Yes     Frequency: 3.0 times per week     Types: Marijuana (Weed)       SCREENINGS        Junior Coma Scale  Eye Opening: Spontaneous  Best Verbal Response:  Oriented  Best Motor Response: Obeys commands  [place] Coma Scale Score: 15                CIWA Assessment  BP: 128/87  Pulse: 54           PHYSICAL EXAM  1 or more Elements     ED Triage Vitals [01/28/25 0357]   BP Systolic BP Percentile Diastolic BP Percentile Temp Temp Source Pulse Respirations SpO2   128/87 -- -- 98.2 °F (36.8 °C) Oral (!) 46 14 99 %      Height Weight - Scale         1.778 m (5' 10\") 81.2 kg (179 lb)                      Constitutional/General: Alert and oriented x3; well-appearing nontoxic no acute distress  Head: Normocephalic and atraumatic  Eyes: PERRL, EOMI, conjunctiva normal, sclera non icteric  ENT:  Oropharynx clear, handling secretions, no trismus, no asymmetry of the posterior oropharynx or uvular edema  Neck: Supple, full ROM, no stridor, no meningeal signs  Respiratory: Lungs clear to auscultation bilaterally, no wheezes, rales, or rhonchi. Not in respiratory distress  Cardiovascular:  Regular rate. Regular rhythm. No murmurs, no gallops, no rubs. 2+ distal pulses. Equal extremity pulses.   Chest: No chest wall tenderness  GI:  Abdomen Soft, Non tender, Non distended.  No rebound, guarding, or rigidity. No pulsatile masses.  Musculoskeletal: Moves all extremities x 4. Warm and well perfused, no clubbing, no cyanosis, no edema. Capillary refill <3 seconds  Integument: skin warm and dry. No rashes.   Neurologic: GCS 15, no focal deficits, symmetric strength 5/5 in the upper and lower extremities bilaterally  Psychiatric: Normal Affect            DIAGNOSTIC RESULTS   LABS:      I have personally reviewed and interpreted all laboratory and imaging results for this patient. Results are listed below.     LABS:  Results for orders placed or performed during the hospital encounter of 01/28/25   Urinalysis with Microscopic   Result Value Ref Range    Color, UA Yellow Yellow    Turbidity UA Clear Clear    Glucose, Ur NEGATIVE NEGATIVE mg/dL    Bilirubin, Urine NEGATIVE NEGATIVE    Ketones,

## 2025-01-29 LAB
MICROORGANISM SPEC CULT: NO GROWTH
SERVICE CMNT-IMP: NORMAL
SPECIMEN DESCRIPTION: NORMAL

## 2025-01-30 LAB
CHLAMYDIA DNA UR QL NAA+PROBE: NEGATIVE
N GONORRHOEA DNA UR QL NAA+PROBE: NEGATIVE
SPECIMEN DESCRIPTION: NORMAL